# Patient Record
Sex: FEMALE | Race: OTHER | Employment: STUDENT | URBAN - METROPOLITAN AREA
[De-identification: names, ages, dates, MRNs, and addresses within clinical notes are randomized per-mention and may not be internally consistent; named-entity substitution may affect disease eponyms.]

---

## 2018-02-06 ENCOUNTER — HOSPITAL ENCOUNTER (OUTPATIENT)
Age: 38
Setting detail: OBSERVATION
Discharge: HOME | End: 2018-02-06
Attending: OBSTETRICS & GYNECOLOGY | Admitting: OBSTETRICS & GYNECOLOGY
Payer: COMMERCIAL

## 2018-02-06 ENCOUNTER — ROUTINE PRENATAL (OUTPATIENT)
Dept: PERINATAL CARE | Age: 38
End: 2018-02-06
Payer: COMMERCIAL

## 2018-02-06 VITALS
HEART RATE: 93 BPM | WEIGHT: 156 LBS | BODY MASS INDEX: 29.45 KG/M2 | HEIGHT: 61 IN | RESPIRATION RATE: 18 BRPM | DIASTOLIC BLOOD PRESSURE: 53 MMHG | TEMPERATURE: 98.4 F | SYSTOLIC BLOOD PRESSURE: 94 MMHG

## 2018-02-06 VITALS
HEART RATE: 92 BPM | TEMPERATURE: 98.5 F | RESPIRATION RATE: 16 BRPM | DIASTOLIC BLOOD PRESSURE: 57 MMHG | SYSTOLIC BLOOD PRESSURE: 119 MMHG

## 2018-02-06 DIAGNOSIS — O34.30 CERVICAL INCOMPETENCE, ANTEPARTUM CONDITION OR COMPLICATION: ICD-10-CM

## 2018-02-06 DIAGNOSIS — O09.522 ELDERLY MULTIGRAVIDA IN SECOND TRIMESTER: ICD-10-CM

## 2018-02-06 DIAGNOSIS — Z3A.21 21 WEEKS GESTATION OF PREGNANCY: ICD-10-CM

## 2018-02-06 DIAGNOSIS — O35.03X0 CHOROID PLEXUS CYST OF FETUS AFFECTING CARE OF MOTHER, ANTEPARTUM, SINGLE OR UNSPECIFIED FETUS: ICD-10-CM

## 2018-02-06 DIAGNOSIS — O26.879 CERVICAL SHORTENING, ANTEPARTUM CONDITION OR COMPLICATION: Primary | ICD-10-CM

## 2018-02-06 PROBLEM — O09.299 HISTORY OF MISCARRIAGE, CURRENTLY PREGNANT: Status: ACTIVE | Noted: 2018-02-06

## 2018-02-06 PROBLEM — O09.529 AMA (ADVANCED MATERNAL AGE) MULTIGRAVIDA 35+: Status: ACTIVE | Noted: 2018-02-06

## 2018-02-06 PROBLEM — O09.92 HRP (HIGH RISK PREGNANCY), SECOND TRIMESTER: Status: ACTIVE | Noted: 2018-02-06

## 2018-02-06 PROBLEM — Z86.32 HISTORY OF GESTATIONAL DIABETES: Status: ACTIVE | Noted: 2018-02-06

## 2018-02-06 LAB
ABO/RH: NORMAL
ANTIBODY SCREEN: NEGATIVE
ARM BAND NUMBER: NORMAL
CULTURE: NORMAL
CULTURE: NORMAL
DIRECT EXAM: NORMAL
EXPIRATION DATE: NORMAL
HCT VFR BLD CALC: 33.7 % (ref 36.3–47.1)
HEMOGLOBIN: 10.5 G/DL (ref 11.9–15.1)
Lab: NORMAL
Lab: NORMAL
MCH RBC QN AUTO: 25.9 PG (ref 25.2–33.5)
MCHC RBC AUTO-ENTMCNC: 31.2 G/DL (ref 28.4–34.8)
MCV RBC AUTO: 83 FL (ref 82.6–102.9)
NRBC AUTOMATED: 0 PER 100 WBC
PDW BLD-RTO: 14.1 % (ref 11.8–14.4)
PLATELET # BLD: 225 K/UL (ref 138–453)
PMV BLD AUTO: 9.4 FL (ref 8.1–13.5)
RBC # BLD: 4.06 M/UL (ref 3.95–5.11)
SPECIMEN DESCRIPTION: NORMAL
SPECIMEN DESCRIPTION: NORMAL
STATUS: NORMAL
STATUS: NORMAL
WBC # BLD: 9.4 K/UL (ref 3.5–11.3)

## 2018-02-06 PROCEDURE — 76817 TRANSVAGINAL US OBSTETRIC: CPT | Performed by: OBSTETRICS & GYNECOLOGY

## 2018-02-06 PROCEDURE — 87086 URINE CULTURE/COLONY COUNT: CPT

## 2018-02-06 PROCEDURE — 87480 CANDIDA DNA DIR PROBE: CPT

## 2018-02-06 PROCEDURE — 36415 COLL VENOUS BLD VENIPUNCTURE: CPT

## 2018-02-06 PROCEDURE — 85027 COMPLETE CBC AUTOMATED: CPT

## 2018-02-06 PROCEDURE — 87510 GARDNER VAG DNA DIR PROBE: CPT

## 2018-02-06 PROCEDURE — 87591 N.GONORRHOEAE DNA AMP PROB: CPT

## 2018-02-06 PROCEDURE — 86901 BLOOD TYPING SEROLOGIC RH(D): CPT

## 2018-02-06 PROCEDURE — 99244 OFF/OP CNSLTJ NEW/EST MOD 40: CPT | Performed by: OBSTETRICS & GYNECOLOGY

## 2018-02-06 PROCEDURE — 86900 BLOOD TYPING SEROLOGIC ABO: CPT

## 2018-02-06 PROCEDURE — 87491 CHLMYD TRACH DNA AMP PROBE: CPT

## 2018-02-06 PROCEDURE — 76811 OB US DETAILED SNGL FETUS: CPT | Performed by: OBSTETRICS & GYNECOLOGY

## 2018-02-06 PROCEDURE — 99234 HOSP IP/OBS SM DT SF/LOW 45: CPT | Performed by: OBSTETRICS & GYNECOLOGY

## 2018-02-06 PROCEDURE — 86850 RBC ANTIBODY SCREEN: CPT

## 2018-02-06 PROCEDURE — 87660 TRICHOMONAS VAGIN DIR PROBE: CPT

## 2018-02-06 PROCEDURE — G0378 HOSPITAL OBSERVATION PER HR: HCPCS

## 2018-02-06 NOTE — H&P
OBSTETRICAL HISTORY Formerly [facility]    Date: 2018       Time: 2:45 PM   Patient Name: Mekhi Singh     Patient : 1980  Room/Bed: 7155/1583-86    Admission Date/Time: 2018  2:14 PM      CC: Short cervix     HPI: Mekhi Singh is a 30 y.o. Zarina Lulas at 21w2d who presents from [facility] after having an ultrasound that revealed dilated cervix with no remaining/residual cervical length with funneling observed. She is a patient of Dr. Peralta in [facility] who was sent for an [facility] consult today [facility] cervix of 0.86cm at [facility] facility. Patient has no complaints today. She is non-English speaking (speaks Romanian) and only understands a few words here and there. Utilizing  phone, patient's history was obtained. She is accompanied by her  and small child. The patient reports fetal movement is present, denies contractions, denies loss of fluid, denies vaginal bleeding. Pregnancy is complicated by advanced cervical dilation, with no remaining/residual cervical length with funneling observed with history of prior term delivery ( ), fetal choroid plexus cyst, R ovarian cyst, (1x 0.53 x 0.73 cm), Hx of E coli UTI this pregnancy, history of GDMA (G1). DATING:  LMP: Patient's last menstrual period was 09/10/2017.   Estimated Date of Delivery: 18   Based on: LMP, confirmed by 8w3d US    PREGNANCY RISK FACTORS:  Patient Active Problem List   Diagnosis    Hx of SAB (May 2017)    HRP (high risk pregnancy), second trimester    Elderly multigravida in second trimester    Choroid plexus cysts, fetal, affecting care of mother, antepartum    Cervical shortening, antepartum condition or complication    Cervical incompetence, antepartum condition or complication    AMA    H/O GDMA1        Steroids Given In This Pregnancy:  no     REVIEW OF SYSTEMS:   Constitutional: negative fever, negative chills  HEENT: negative visual disturbances, negative headaches  Respiratory: negative dyspnea, negative cough  Cardiovascular: negative chest pain,  negative palpitations  Gastrointestinal: negative abdominal pain, negative RUQ pain, negative N/V, negative diarrhea, negative constipation  Genitourinary: negative dysuria, negative vaginal discharge  Dermatological: negative rash  Hematologic: negative bruising  Immunologic/Lymphatic: negative recent illness, negative recent sick contact  Musculoskeletal: negative back pain, negative myalgias, negative arthralgias  Neurological:  negative dizziness, negative weakness  Behavior/Psych: negative depression, negative anxiety      OBSTETRICAL HISTORY:   Obstetric History       T1      L1     SAB0   TAB0   Ectopic0   Molar0   Multiple0   Live Births1       # Outcome Date GA Lbr Herbie/2nd Weight Sex Delivery Anes PTL Lv   3 Current            2 SAB 2017           1 Term 16 38w0d  6 lb 6 oz (2.892 kg) M Vag-Spont   DANIEL          PAST MEDICAL HISTORY:   has no past medical history on file. PAST SURGICAL HISTORY:   has no past surgical history on file. ALLERGIES:  has No Known Allergies. MEDICATIONS:  Prior to Admission medications    Not on File       FAMILY HISTORY:  family history is not on file. SOCIAL HISTORY:   reports that she has never smoked. She has never used smokeless tobacco. She reports that she does not drink alcohol or use drugs.     VITALS:  Vitals:    18 1443   BP: (!) 119/57   Pulse: 92   Resp: 16   Temp: 98.5 °F (36.9 °C)   TempSrc: Oral         PHYSICAL EXAM:  FHTs: 158  [place]: contractions, none    General appearance:  no apparent distress, alert and cooperative  Neurologic:  alert, oriented, normal speech, no focal findings or movement disorder noted  Lungs:  No increased work of breathing, good air exchange, clear to auscultation bilaterally, no crackles or wheezing  Heart:  regular rate and rhythm and no murmur    Abdomen:  soft, gravid, non-tender, no right upper quadrant tenderness, no CVA tenderness, uterus non-tender, no signs of abruption and no signs of chorioamnionitis  Extremities:  no calf tenderness, non edematous, DTRs: normal    Pelvic Exam: to be completed by labor and delivery resident      OMM Structural Exam:  Chief Complaint:  Pregnancy    Anterior/ Posterior Spinal Curves: Lumbar Lordosis -  Increased  Scoliosis (Lateral Spinal Curves): None  Assessment Tool:  T= Tenderness, A= Asymmetry, R= Restricted Motion (A=Active, P=Passive), T=Tissue Texture Changes  Region Evaluated : Severity / Specific of Major Somatic Dysfunction  M99.03 Lumbar -  Minor TART - more than BG levels -   Major Correlations with:  Genitourinary  Structural Diagnosis: Increased lumbar lordosis  Treatment Plan: Outpatient       [facility] US 18:  Position: Cephalic  Placental Location: anterior  Fetal Heart Tones: Present, open hands seen  Fetal Movement: Present  Amniotic Fluid Index/Volume: JOB wnl  Estimated Fetal Weight:  1 lbs 0oz  Cervical length:    PRENATAL LAB RESULTS: (found in paper records)  Blood Type/Rh: O pos  Antibody Screen: not available, ordered  Hemoglobin, Hematocrit, Platelets: 46.0 / 68.4 / 236  Rubella: immune  T.  Pallidum, IgG: RPR non-reactive   Hepatitis B Surface Antigen: non-reactive   HIV: non-reactive   Sickle Cell Screen: not available  Gonorrhea: negative  Chlamydia: negative  Urine culture: positive, date: 17    1 hour Glucose Tolerance Test: 118  3 hour Glucose Tolerance Test: NA    Group B Strep: not done  Cystic Fibrosis Screen: negative  First Trimester Screen: not available  MSAFP/Multiple Markers: Abnormal MSAFP (1.55 MoM)  Non-Invasive Prenatal Testing: ordered  Anatomy US: normal, anterior, central cord insertion, 3vc, male  Right ovarian cyst 3.6 x 2.8 x 3.0, cervix 4.1 cm on US at [facility] 86:  Sanjana Bro is a 30 y.o. female  at 22w2d with no residual after delivery can be treated successfully with antibiotics there are rare cases of sepsis and catastrophic outcomes with infection related to cerclage or underlying intra-amniotic infection. We discussed the limitations of cerclage and that cerclage is not offered after viability. We discussed that viability is roughly 24 weeks for survival but that intact survival is low at the thresholds of viability 23-24 weeks. We discussed that there is no black and white answer about placement of a cerclage for physical exam findings and that the benefits may not outweigh the risks but the limited studies available provide support for some benefit in these cases in absence of signs of infection or labor. Given the patient's advanced cervical dilation and risk of  delivery she and her  after extensive counseling (greater than 1 hour on the phone with the [facility] ) with both myself and Dr. Ileana SALCEDO would like to attempt cerclage placement. 1500PM  Patient was examined on labor and delivery and speculum exam was significant for approximately 8 mm of remaining cervix with a visual dilation of 1-2 cm with membranes visibile at the internal os. Given these exam findings it is reasonable to attempt cerclage placement if no evidence of infection or labor with our evaluation. Will plan work up and evaluation to include CBC, UA, cervical cultures and monitoring with toco to evaluate for contractions. If any signs of labor or infection cerclage would be contraindicated. Approximately 1600 PM  Patient noted to have contractions on toco. Discussed with patient  that given contractions would not recommend cerclage placement. Discussion with [facility]  Serenity Thurman. Patient wishes to wait for her  to discuss further options. Plan update at approximately 20:00  Patient's contractions stopped after IV fluids.  Upon further approximately 45 minute discussion with Dr. Eveline elmore and difficulty of this situation and we again reviewed that should she not deliver before the periviable period at approximately 23-24 weeks we would discuss interventions such as steroid injections, magnesium etc for improving the fetal chance of survival but that during this previable period there are no interventions to change the outcome of fetal demise should she progress in labor and that given her contractions and advanced cervical dilation the risk of placing cerclage outweighs the benefits and that the risk of spontaneous delivery is great. The patient stated that her strong belief in god would help and she felt strongly that things would be ok and that her baby would be fine. Given the devastating news she received today it is understandable that she is grieving and worried about the out come and we discussed the importance of close follow up given the risks for catastrophic outcome including risk of infection, bleeding, rapid delivery. The patient and her  both had their questions answered and verbalized their understanding of the risks of the situation and their desire for discharge for follow up with their primary obgyn. The on call physician from [facility] was notified of the patient's decision to be discharged and continue care in [facility] and was updated of the situation and care provided today.     Perry Sandoval MD

## 2018-02-07 LAB
C TRACH DNA GENITAL QL NAA+PROBE: NEGATIVE
N. GONORRHOEAE DNA: NEGATIVE
SEND OUT REPORT: NORMAL
TEST NAME: NORMAL

## 2018-02-07 NOTE — PROGRESS NOTES
Late Entry note  Patient and her  now present for further discussion please see my update in the history and physical for full details of the course of the many conversations with the patient and her . Patient's contractions stopped after IV fluids. 45 minute discussion with Dr. Shahzad Inman translating Her contractions improved after IV fluids but given the inital contractions on toco and cervical exam I recommended against cerclage given the poor risk benefit ratio and we discussed further the risk of infection and the great risk of  previable delivery with exposed membranes and a cervical exam of 3 cm with no residual cervix. The patient and her  were present for a lengthy discussion reviewing again the case including the ultrasound findings, why she was referred from [facility] and the [facility] ultrasound findings of a choroid plexus cyst as secondary to her cervical findings and most likely benign. The patient understands that given her gestational age at this time tocolysis is not recommended and that observation for further cervical change would be warranted. We also discussed that we would start vaginal progesterone as recommended by [facility]. Initially the patient and her  agreed to overnight observation (see my note from 18:20 PM) however given their personal constraints with a toddler they requested discharge this evening. With her  present with shared decision making, I again offered her the option of staying for observation overnight for repeat cervical exam in the morning vs discharge with risk of delivery. We reviewed instructions to go to the closest hospital should she have bleeding, leaking of fluid, contractions or pressure given the extremely high risk of delivery. Patient and her  verbalized their understanding of our many lengthy conversations with Dr. Shahzad Inman providing explanations in the patient's native Ukrainian.  The patient states she lives only minutes

## 2018-02-07 NOTE — FLOWSHEET NOTE
Dr Magdalena Morris and Dr Ron Soni in room to give in detail instructions on progesterone application for pt. Pt asked questions and answered questions at this time.

## 2018-02-14 ENCOUNTER — ROUTINE PRENATAL (OUTPATIENT)
Dept: PERINATAL CARE | Age: 38
End: 2018-02-14
Payer: COMMERCIAL

## 2018-02-14 VITALS
RESPIRATION RATE: 18 BRPM | TEMPERATURE: 98.4 F | SYSTOLIC BLOOD PRESSURE: 104 MMHG | WEIGHT: 153 LBS | BODY MASS INDEX: 28.89 KG/M2 | DIASTOLIC BLOOD PRESSURE: 55 MMHG | HEART RATE: 86 BPM

## 2018-02-14 DIAGNOSIS — O09.522 ELDERLY MULTIGRAVIDA IN SECOND TRIMESTER: ICD-10-CM

## 2018-02-14 DIAGNOSIS — O47.02 PRETERM UTERINE CONTRACTIONS IN SECOND TRIMESTER, ANTEPARTUM: ICD-10-CM

## 2018-02-14 DIAGNOSIS — Z3A.22 22 WEEKS GESTATION OF PREGNANCY: ICD-10-CM

## 2018-02-14 DIAGNOSIS — O26.879 CERVICAL SHORTENING, ANTEPARTUM CONDITION OR COMPLICATION: Primary | ICD-10-CM

## 2018-02-14 PROCEDURE — 76817 TRANSVAGINAL US OBSTETRIC: CPT | Performed by: OBSTETRICS & GYNECOLOGY

## 2018-02-28 ENCOUNTER — ROUTINE PRENATAL (OUTPATIENT)
Dept: PERINATAL CARE | Age: 38
End: 2018-02-28
Payer: COMMERCIAL

## 2018-02-28 VITALS
SYSTOLIC BLOOD PRESSURE: 109 MMHG | BODY MASS INDEX: 29.26 KG/M2 | WEIGHT: 155 LBS | HEART RATE: 78 BPM | TEMPERATURE: 98.2 F | RESPIRATION RATE: 16 BRPM | DIASTOLIC BLOOD PRESSURE: 72 MMHG

## 2018-02-28 DIAGNOSIS — Z3A.24 24 WEEKS GESTATION OF PREGNANCY: ICD-10-CM

## 2018-02-28 DIAGNOSIS — Z36.4 ANTENATAL SCREENING FOR FETAL GROWTH RETARDATION USING ULTRASONICS: ICD-10-CM

## 2018-02-28 DIAGNOSIS — O35.03X0 CHOROID PLEXUS CYST OF FETUS AFFECTING CARE OF MOTHER, ANTEPARTUM, SINGLE OR UNSPECIFIED FETUS: ICD-10-CM

## 2018-02-28 DIAGNOSIS — O09.522 ELDERLY MULTIGRAVIDA IN SECOND TRIMESTER: Primary | ICD-10-CM

## 2018-02-28 DIAGNOSIS — O26.879 CERVICAL SHORTENING, ANTEPARTUM CONDITION OR COMPLICATION: ICD-10-CM

## 2018-02-28 PROBLEM — O09.92 HRP (HIGH RISK PREGNANCY), SECOND TRIMESTER: Status: ACTIVE | Noted: 2018-02-28

## 2018-02-28 LAB
ABDOMINAL CIRCUMFERENCE: NORMAL CM
BIPARIETAL DIAMETER: NORMAL CM
ESTIMATED FETAL WEIGHT: NORMAL GRAMS
FEMORAL DIAMETER: NORMAL CM
HC/AC: NORMAL
HEAD CIRCUMFERENCE: NORMAL CM

## 2018-02-28 PROCEDURE — 76817 TRANSVAGINAL US OBSTETRIC: CPT | Performed by: OBSTETRICS & GYNECOLOGY

## 2018-02-28 PROCEDURE — 76816 OB US FOLLOW-UP PER FETUS: CPT | Performed by: OBSTETRICS & GYNECOLOGY

## 2018-03-08 ENCOUNTER — TELEPHONE (OUTPATIENT)
Dept: PERINATAL CARE | Age: 38
End: 2018-03-08

## 2018-03-14 ENCOUNTER — ROUTINE PRENATAL (OUTPATIENT)
Dept: PERINATAL CARE | Age: 38
End: 2018-03-14
Payer: COMMERCIAL

## 2018-03-14 ENCOUNTER — TELEPHONE (OUTPATIENT)
Dept: PERINATAL CARE | Age: 38
End: 2018-03-14

## 2018-03-14 VITALS
TEMPERATURE: 97.8 F | DIASTOLIC BLOOD PRESSURE: 64 MMHG | RESPIRATION RATE: 16 BRPM | BODY MASS INDEX: 29.83 KG/M2 | SYSTOLIC BLOOD PRESSURE: 114 MMHG | HEIGHT: 61 IN | HEART RATE: 90 BPM | WEIGHT: 158 LBS

## 2018-03-14 DIAGNOSIS — O26.879 CERVICAL SHORTENING, ANTEPARTUM CONDITION OR COMPLICATION: Primary | ICD-10-CM

## 2018-03-14 DIAGNOSIS — Z3A.26 26 WEEKS GESTATION OF PREGNANCY: ICD-10-CM

## 2018-03-14 PROCEDURE — 76817 TRANSVAGINAL US OBSTETRIC: CPT | Performed by: OBSTETRICS & GYNECOLOGY

## 2018-03-14 PROCEDURE — 96372 THER/PROPH/DIAG INJ SC/IM: CPT | Performed by: OBSTETRICS & GYNECOLOGY

## 2018-03-14 NOTE — PATIENT INSTRUCTIONS
Pt to follow up with referring physician    We would be happy to help get you established with one of our [facility] Providers. Please contact our Pre[facility] directly and they will assist you in making an appointment.   Phone: [phone]

## 2018-03-28 ENCOUNTER — ROUTINE PRENATAL (OUTPATIENT)
Dept: PERINATAL CARE | Age: 38
End: 2018-03-28
Payer: COMMERCIAL

## 2018-03-28 VITALS
SYSTOLIC BLOOD PRESSURE: 119 MMHG | HEART RATE: 88 BPM | RESPIRATION RATE: 16 BRPM | BODY MASS INDEX: 30.21 KG/M2 | HEIGHT: 61 IN | WEIGHT: 160 LBS | DIASTOLIC BLOOD PRESSURE: 58 MMHG | TEMPERATURE: 98.5 F

## 2018-03-28 DIAGNOSIS — O26.879 CERVICAL SHORTENING, ANTEPARTUM CONDITION OR COMPLICATION: Primary | ICD-10-CM

## 2018-03-28 DIAGNOSIS — O35.03X0 CHOROID PLEXUS CYST OF FETUS AFFECTING CARE OF MOTHER, ANTEPARTUM, SINGLE OR UNSPECIFIED FETUS: ICD-10-CM

## 2018-03-28 DIAGNOSIS — O09.523 ELDERLY MULTIGRAVIDA IN THIRD TRIMESTER: ICD-10-CM

## 2018-03-28 DIAGNOSIS — Z3A.28 28 WEEKS GESTATION OF PREGNANCY: ICD-10-CM

## 2018-03-28 DIAGNOSIS — Z36.4 ANTENATAL SCREENING FOR FETAL GROWTH RETARDATION USING ULTRASONICS: ICD-10-CM

## 2018-03-28 PROCEDURE — 76816 OB US FOLLOW-UP PER FETUS: CPT | Performed by: OBSTETRICS & GYNECOLOGY

## 2018-03-28 PROCEDURE — 76819 FETAL BIOPHYS PROFIL W/O NST: CPT | Performed by: OBSTETRICS & GYNECOLOGY

## 2018-03-28 PROCEDURE — 76817 TRANSVAGINAL US OBSTETRIC: CPT | Performed by: OBSTETRICS & GYNECOLOGY

## 2018-03-28 PROCEDURE — 96372 THER/PROPH/DIAG INJ SC/IM: CPT | Performed by: OBSTETRICS & GYNECOLOGY

## 2018-04-11 ENCOUNTER — ROUTINE PRENATAL (OUTPATIENT)
Dept: PERINATAL CARE | Age: 38
End: 2018-04-11
Payer: COMMERCIAL

## 2018-04-11 VITALS
RESPIRATION RATE: 16 BRPM | HEART RATE: 87 BPM | HEIGHT: 61 IN | SYSTOLIC BLOOD PRESSURE: 113 MMHG | TEMPERATURE: 97.7 F | DIASTOLIC BLOOD PRESSURE: 69 MMHG | BODY MASS INDEX: 30.4 KG/M2 | WEIGHT: 161 LBS

## 2018-04-11 DIAGNOSIS — O09.523 ELDERLY MULTIGRAVIDA IN THIRD TRIMESTER: ICD-10-CM

## 2018-04-11 DIAGNOSIS — O26.879 CERVICAL SHORTENING, ANTEPARTUM CONDITION OR COMPLICATION: Primary | ICD-10-CM

## 2018-04-11 DIAGNOSIS — Z3A.30 30 WEEKS GESTATION OF PREGNANCY: ICD-10-CM

## 2018-04-11 PROCEDURE — 76819 FETAL BIOPHYS PROFIL W/O NST: CPT | Performed by: OBSTETRICS & GYNECOLOGY

## 2018-04-11 PROCEDURE — G8427 DOCREV CUR MEDS BY ELIG CLIN: HCPCS | Performed by: OBSTETRICS & GYNECOLOGY

## 2018-04-11 PROCEDURE — 76817 TRANSVAGINAL US OBSTETRIC: CPT | Performed by: OBSTETRICS & GYNECOLOGY

## 2018-04-11 PROCEDURE — 99211 OFF/OP EST MAY X REQ PHY/QHP: CPT | Performed by: OBSTETRICS & GYNECOLOGY

## 2018-04-11 PROCEDURE — 96372 THER/PROPH/DIAG INJ SC/IM: CPT | Performed by: OBSTETRICS & GYNECOLOGY

## 2018-04-11 PROCEDURE — G8417 CALC BMI ABV UP PARAM F/U: HCPCS | Performed by: OBSTETRICS & GYNECOLOGY

## 2018-04-11 RX ORDER — FERROUS SULFATE 325(65) MG
325 TABLET ORAL
COMMUNITY

## 2018-04-25 ENCOUNTER — ROUTINE PRENATAL (OUTPATIENT)
Dept: PERINATAL CARE | Age: 38
End: 2018-04-25
Payer: COMMERCIAL

## 2018-04-25 VITALS
WEIGHT: 162 LBS | HEART RATE: 94 BPM | BODY MASS INDEX: 30.61 KG/M2 | DIASTOLIC BLOOD PRESSURE: 69 MMHG | TEMPERATURE: 98.1 F | RESPIRATION RATE: 16 BRPM | SYSTOLIC BLOOD PRESSURE: 103 MMHG

## 2018-04-25 DIAGNOSIS — O26.879 CERVICAL SHORTENING, ANTEPARTUM CONDITION OR COMPLICATION: ICD-10-CM

## 2018-04-25 DIAGNOSIS — Z13.89 ENCOUNTER FOR ROUTINE SCREENING FOR MALFORMATION USING ULTRASONICS: ICD-10-CM

## 2018-04-25 DIAGNOSIS — Z36.4 ANTENATAL SCREENING FOR FETAL GROWTH RETARDATION USING ULTRASONICS: ICD-10-CM

## 2018-04-25 DIAGNOSIS — Z3A.32 32 WEEKS GESTATION OF PREGNANCY: ICD-10-CM

## 2018-04-25 DIAGNOSIS — O09.523 ELDERLY MULTIGRAVIDA IN THIRD TRIMESTER: ICD-10-CM

## 2018-04-25 DIAGNOSIS — O35.03X0 CHOROID PLEXUS CYST OF FETUS AFFECTING CARE OF MOTHER, ANTEPARTUM, SINGLE OR UNSPECIFIED FETUS: Primary | ICD-10-CM

## 2018-04-25 PROCEDURE — 76819 FETAL BIOPHYS PROFIL W/O NST: CPT | Performed by: OBSTETRICS & GYNECOLOGY

## 2018-04-25 PROCEDURE — 96372 THER/PROPH/DIAG INJ SC/IM: CPT | Performed by: OBSTETRICS & GYNECOLOGY

## 2018-04-25 PROCEDURE — 76805 OB US >/= 14 WKS SNGL FETUS: CPT | Performed by: OBSTETRICS & GYNECOLOGY

## 2018-04-25 PROCEDURE — 76817 TRANSVAGINAL US OBSTETRIC: CPT | Performed by: OBSTETRICS & GYNECOLOGY
